# Patient Record
Sex: FEMALE | Race: WHITE | Employment: OTHER | URBAN - METROPOLITAN AREA
[De-identification: names, ages, dates, MRNs, and addresses within clinical notes are randomized per-mention and may not be internally consistent; named-entity substitution may affect disease eponyms.]

---

## 2022-03-04 ENCOUNTER — OFFICE VISIT (OUTPATIENT)
Dept: NEUROLOGY | Age: 67
End: 2022-03-04
Payer: MEDICARE

## 2022-03-04 VITALS
HEART RATE: 85 BPM | RESPIRATION RATE: 16 BRPM | DIASTOLIC BLOOD PRESSURE: 66 MMHG | TEMPERATURE: 98 F | BODY MASS INDEX: 33.43 KG/M2 | HEIGHT: 66 IN | SYSTOLIC BLOOD PRESSURE: 106 MMHG | OXYGEN SATURATION: 98 % | WEIGHT: 208 LBS

## 2022-03-04 DIAGNOSIS — G20 PARKINSONISM, UNSPECIFIED PARKINSONISM TYPE (HCC): ICD-10-CM

## 2022-03-04 DIAGNOSIS — R25.9 ABNORMAL INVOLUNTARY MOVEMENT: Primary | ICD-10-CM

## 2022-03-04 PROCEDURE — 99204 OFFICE O/P NEW MOD 45 MIN: CPT | Performed by: PSYCHIATRY & NEUROLOGY

## 2022-03-04 RX ORDER — ZINC GLUCONATE 10 MG
LOZENGE ORAL
COMMUNITY

## 2022-03-04 RX ORDER — CHOLECALCIFEROL (VITAMIN D3) 125 MCG
CAPSULE ORAL
COMMUNITY

## 2022-03-04 RX ORDER — CHOLECALCIFEROL (VITAMIN D3) 50 MCG
CAPSULE ORAL
COMMUNITY

## 2022-03-04 RX ORDER — ESCITALOPRAM OXALATE 20 MG/1
40 TABLET ORAL DAILY
COMMUNITY

## 2022-03-04 RX ORDER — LANOLIN ALCOHOL/MO/W.PET/CERES
CREAM (GRAM) TOPICAL
COMMUNITY

## 2022-03-04 RX ORDER — LANOLIN ALCOHOL/MO/W.PET/CERES
1000 CREAM (GRAM) TOPICAL DAILY
COMMUNITY

## 2022-03-04 RX ORDER — MINERAL OIL
180 ENEMA (ML) RECTAL DAILY
COMMUNITY

## 2022-03-04 RX ORDER — INSULIN GLARGINE 100 [IU]/ML
38 INJECTION, SOLUTION SUBCUTANEOUS DAILY
COMMUNITY

## 2022-03-04 NOTE — PATIENT INSTRUCTIONS
RESULT POLICY      If we have ordered testing for you, know that; \"NO NEWS IS GOOD NEWS! \"     It is our policy that we no longer call patients with results, nor do we  give test results over the phone. We schedule follow up appointments so that your results can be discussed in person. This allows you to address any questions you have regarding the results. If you choose to go to an imaging center outside of [facility], it is your responsibility to bring imaging report and disc to follow up appointment. If something of concern is revealed on your test, we will contact you to discuss the matter and if needed schedule a sooner follow up appointment. Additionally, results may be found by using the My Chart feature and one of our patient service representatives at the  can give you instructions on how to access this feature to utilize our electronic medical record system. Thank you for your understanding. [facility]   Statement to Patients  April 1, 2014      In an effort to ensure the large volume of patient prescription refills is processed in the most efficient and expeditious manner, we are asking our patients to assist us by calling your Pharmacy for all prescription refills, this will include also your  Mail Order Pharmacy. The pharmacy will contact our office electronically to continue the refill process. Please do not wait until the last minute to call your pharmacy. We need at least 48 hours (2days) to fill prescriptions. We also encourage you to call your pharmacy before going to  your prescription to make sure it is ready. With regard to controlled substance prescription refill requests (narcotic refills) that need to be picked up at our office, we ask your cooperation by providing us with at least 72 hours (3days) notice that you will need a refill.     We will not refill narcotic prescription refill requests after 4:00pm on any weekday, Monday through Thursday, or after 2:00pm on Fridays, or on the weekends. We encourage everyone to explore another way of getting your prescription refill request processed using [email or web address], our patient web portal through our electronic medical record system. [email or web address] is an efficient and effective way to communicate your medication request directly to the office and  downloadable as an bing on your smart phone . [email or web address] also features a review functionality that allows you to view your medication list as well as leave messages for your physician. Are you ready to get connected? If so please review the attatched instructions or speak to any of our staff to get you set up right away! Thank you so much for your cooperation. Should you have any questions please contact our Practice Administrator.

## 2022-03-04 NOTE — PROGRESS NOTES
[facility] Neurology Clinics and  [address] at [facility] Neurology Clinics at [facility], [address] [facility] [address]  [phone] Office  [phone] Facsimile           Referring: Self    Chief Complaint   Patient presents with    New Patient     Parkinson's // Previous Neuro- Shane Lynne - Parkinson's - seen twice    52-year-old lady presents today for neurologic evaluation regarding right upper extremity tremor. She notes that she started having this tremor probably 2 or 3 years ago. She saw Dr. Sushant Miller x2 and he diagnosed her with Parkinson's but noted only tremor and therefore treatment was deferred. No evaluation in terms of scanning etc. was performed and we discussed the clinical diagnosis. In any regard she notes that she has tremor in the right upper extremity. It is a rest type tremor. She notices an increase in the tremor when she is driving and has her hand on the steering wheel and also notices it when she is walking. She can stop it. No shaking in any other extremity. No change in her gait. Her balance has not been affected. Sometimes she feels like she has tremor on the inside on that right side. She had a first cousin who had some type of tremor but also had dementia and she is unsure of the details. Her sister-in-law has Parkinson's but she is not a blood relation. She has no hobbies that would expose her to toxins such as [employer] working and she has never been a  or had any other exposures.     Records from [facility] neurology finds diagnoses of tremor as well as REM sleep behavior disorder type 2 diabetes and proliferative diabetic retinopathy left eye    Past Medical History:   Diagnosis Date    Anxiety     Depression     Diabetes ([address]        Past Surgical History:   Procedure Laterality Date    HX CARPAL TUNNEL RELEASE      HX  [address]    HX CHOLECYSTECTOMY  1981    HX GASTRIC BYPASS  2010     Current Outpatient Medications   Medication Sig Dispense Refill    liraglutide (VICTOZA) 0.6 mg/0.1 mL (18 mg/3 mL) pnij 1.8 mg by SubCUTAneous route daily.  insulin glargine (LANTUS,BASAGLAR) 100 unit/mL (3 mL) inpn 38 Units by SubCUTAneous route daily.  escitalopram oxalate (LEXAPRO) 20 mg tablet Take 40 mg by mouth daily.  multivit-min/iron/folic/lutein (CENTRUM SILVER WOMEN PO) Take 1 Tablet by mouth daily.  ferrous sulfate (Iron) 325 mg (65 mg iron) tablet Take  by mouth Daily (before breakfast).  magnesium 250 mg tab Take  by mouth.  cholecalciferol, vitamin D3, 50 mcg (2,000 unit) tab Take  by mouth.  cyanocobalamin 1,000 mcg tablet Take 1,000 mcg by mouth daily.  B.animalis,bifid,infantis,long (Probiotic 4X) 10-15 mg TbEC Take  by mouth.  fexofenadine (ALLEGRA) 180 mg tablet Take 180 mg by mouth daily. Allergies   Allergen Reactions    Morphine Hives and Other (comments)    Doxycycline Other (comments)    Epinephrine Other (comments)       Social History     Tobacco Use    Smoking status: Never Smoker    Smokeless tobacco: Never Used   Vaping Use    Vaping Use: Never used   Substance Use Topics    Alcohol use: Yes     Comment: occass.  Drug use: Never       Family History   Problem Relation Age of Onset    Headache Mother     Dementia Mother     Neuropathy Sister     Neuropathy Brother     Intellectual Disability Son        Review of Systems  Pertinent positives and negatives as noted. Examination  Visit Vitals  /66 (BP 1 Location: Left upper arm, BP Patient Position: Sitting)   Pulse 85   Temp 98 °F (36.7 °C) (Temporal)   Resp 16   Ht 5' 6\" (1.676 m)   Wt 94.3 kg (208 lb)   SpO2 98%   BMI 33.57 kg/m²     Neurologically, she is awake, alert, and oriented with normal speech and language. Her cognition is normal.    Intact cranial nerves 2-12.   No nystagmus. Disk margins are flat bilaterally. She has mild cogwheeling at the right wrist.  No cogwheeling at the left. No postural tremor. No postural reemergence tremor. She has rest tremor of mild degree right upper extremity. Finger tapping as well as hand opening and closing are good. No other bradykinesia. She has no pronation or drift. She generates full strength in the upper and lower extremities to direct confrontational testing. Reflexes are symmetrical in the upper and lower extremities bilaterally. No pathologic reflexes are elicited. Finger nose finger and rapid alternating movements are normal.  Steady gait with some decreased arm swing and she takes a smooth and steady turn. There is no accentuation of tremor with ambulation    Impression/Plan  60-year-old lady with tremor predominant Parkinson's and we discussed pathophysiology etc.  She has not however been evaluated for potential other causes such as ischemic etiology i.e. basal ganglia or brainstem infarct and we therefore will ensure this is not a secondary parkinsonism versus idiopathic Parkinson's with MRI of the brain and carotid Doppler  If the MRI and the Doppler of fine then we will plan to get back together in a year for evaluation and certainly if she has increase in her symptomology where she would like to try medication for the tremor in the interim she will let us know and we would be happy to address      Ethan Thomas MD          This note was created using voice recognition software. Despite editing, there may be syntax errors.

## 2022-03-11 ENCOUNTER — HOSPITAL ENCOUNTER (OUTPATIENT)
Dept: MRI IMAGING | Age: 67
Discharge: HOME | End: 2022-03-11
Attending: PSYCHIATRY & NEUROLOGY
Payer: MEDICARE

## 2022-03-11 DIAGNOSIS — R25.9 ABNORMAL INVOLUNTARY MOVEMENT: ICD-10-CM

## 2022-03-11 DIAGNOSIS — G20 PARKINSONISM, UNSPECIFIED PARKINSONISM TYPE (HCC): ICD-10-CM

## 2022-03-11 PROCEDURE — 70551 MRI BRAIN STEM W/O DYE: CPT

## 2022-03-31 ENCOUNTER — TELEPHONE (OUTPATIENT)
Dept: NEUROLOGY | Age: 67
End: 2022-03-31

## 2022-03-31 NOTE — TELEPHONE ENCOUNTER
Patient would like to speak with Dr. Aakash Castanon. Patient stated she having a lot more symptoms muscle pain and has a break out on her hands. Patients next appointment is not until next year. Please call.

## 2022-04-01 NOTE — TELEPHONE ENCOUNTER
Pt wanted to know if sx could be r/t PD or if she should see rheum that was suggested by pcp. Advised pt that she should see rheum as those sx do not appear to be PD related.

## 2023-03-02 ENCOUNTER — OFFICE VISIT (OUTPATIENT)
Dept: NEUROLOGY | Age: 68
End: 2023-03-02

## 2023-03-02 VITALS
WEIGHT: 208 LBS | HEART RATE: 80 BPM | DIASTOLIC BLOOD PRESSURE: 59 MMHG | SYSTOLIC BLOOD PRESSURE: 123 MMHG | BODY MASS INDEX: 33.57 KG/M2 | RESPIRATION RATE: 16 BRPM | OXYGEN SATURATION: 95 %

## 2023-03-02 DIAGNOSIS — G20 PARKINSONISM, UNSPECIFIED PARKINSONISM TYPE (HCC): Primary | ICD-10-CM

## 2023-03-02 RX ORDER — ATORVASTATIN CALCIUM 10 MG/1
TABLET, FILM COATED ORAL
COMMUNITY
Start: 2023-02-07

## 2023-03-02 RX ORDER — LORAZEPAM 0.5 MG/1
TABLET ORAL
COMMUNITY
Start: 2023-02-07

## 2023-03-02 NOTE — PROGRESS NOTES
[facility] Neurology Clinics and [address] at [facility] Neurology Clinics at [address] [facility], [address] [place] [address] [address] [facility], [address] [place] [address]   [phone]              Chief Complaint   Patient presents with    Parkinsons Disease     Feels more shaky at times     Current Outpatient Medications   Medication Sig Dispense Refill    atorvastatin (LIPITOR) 10 mg tablet       LORazepam (ATIVAN) 0.5 mg tablet TAKE 1 TO 2 TABLETS BY MOUTH TWICE DAILY AS NEEDED FOR ANXIETY      liraglutide (VICTOZA) 0.6 mg/0.1 mL (18 mg/3 mL) pnij 1.8 mg by SubCUTAneous route daily. insulin glargine (LANTUS,BASAGLAR) 100 unit/mL (3 mL) inpn 38 Units by SubCUTAneous route daily. escitalopram oxalate (LEXAPRO) 20 mg tablet Take 40 mg by mouth daily. multivit-min/iron/folic/lutein (CENTRUM SILVER WOMEN PO) Take 1 Tablet by mouth daily. ferrous sulfate 325 mg (65 mg iron) tablet Take  by mouth Daily (before breakfast). magnesium 250 mg tab Take  by mouth. cholecalciferol, vitamin D3, 50 mcg (2,000 unit) tab Take  by mouth.      cyanocobalamin 1,000 mcg tablet Take 1,000 mcg by mouth daily. B.animalis,bifid,infantis,long (Probiotic 4X) 10-15 mg TbEC Take  by mouth. fexofenadine (ALLEGRA) 180 mg tablet Take 180 mg by mouth daily. Allergies   Allergen Reactions    Morphine Hives and Other (comments)    Doxycycline Other (comments)    Epinephrine Other (comments)     Social History     Tobacco Use    Smoking status: Never    Smokeless tobacco: Never   Vaping Use    Vaping Use: Never used   Substance Use Topics    Alcohol use: Yes     Comment: occass. Drug use: Never     52-year-old lady returns today for Parkinson's. I saw her about a year ago for initial consultation. She was previously followed by Dr. Casiano Grandchild. She only had tremor and thus deferred treatment.     MRI of the brain dated March 11, 2022 personally reviewed with age-related changes  Carotid Doppler March 4, 2022 with insignificant stenosis    Today she reports some worsening shakiness. At times particularly at night she feels like she is shaking on the inside. The visible tremor is doing fine. No changes in ambulation. She is staying busy. She was doing water exercise walking in the pool but she is battling her insurance in that regard. Examination  Visit Vitals  BP (!) 123/59   Pulse 80   Resp 16   Wt 94.3 kg (208 lb)   SpO2 95%   BMI 33.57 kg/m²     She is a very pleasant lady. She is awake alert and oriented. Speech and language are normal.  Cognition is normal.  Cranial nerves are intact. She has minimal rest tremor of the right upper and lower extremity. No bradykinesia. No pronation or drift. She ambulates with a good stride and she takes a pivot turn. Impression/Plan  Parkinson's with some perceived worsened shakiness inside as above and we discussed this is pretty common in people with Parkinson's  Overall continue with a watchful approach for now  If she finds tremor increases to the point she would like to try medication or if her gait starts to change she will let me know  Follow-up in 1 year, sooner if needed    Parminder Phillips MD        This note was created using voice recognition software. Despite editing, there may be syntax errors.

## 2024-08-08 ENCOUNTER — OFFICE VISIT (OUTPATIENT)
Age: 69
End: 2024-08-08
Payer: MEDICARE

## 2024-08-08 VITALS
DIASTOLIC BLOOD PRESSURE: 53 MMHG | WEIGHT: 200 LBS | HEIGHT: 66 IN | OXYGEN SATURATION: 97 % | RESPIRATION RATE: 16 BRPM | SYSTOLIC BLOOD PRESSURE: 112 MMHG | BODY MASS INDEX: 32.14 KG/M2 | HEART RATE: 83 BPM

## 2024-08-08 DIAGNOSIS — G20.C PARKINSONIAN TREMOR (HCC): Primary | ICD-10-CM

## 2024-08-08 PROCEDURE — G8419 CALC BMI OUT NRM PARAM NOF/U: HCPCS | Performed by: PSYCHIATRY & NEUROLOGY

## 2024-08-08 PROCEDURE — 1123F ACP DISCUSS/DSCN MKR DOCD: CPT | Performed by: PSYCHIATRY & NEUROLOGY

## 2024-08-08 PROCEDURE — G8400 PT W/DXA NO RESULTS DOC: HCPCS | Performed by: PSYCHIATRY & NEUROLOGY

## 2024-08-08 PROCEDURE — 3017F COLORECTAL CA SCREEN DOC REV: CPT | Performed by: PSYCHIATRY & NEUROLOGY

## 2024-08-08 PROCEDURE — 1036F TOBACCO NON-USER: CPT | Performed by: PSYCHIATRY & NEUROLOGY

## 2024-08-08 PROCEDURE — 1090F PRES/ABSN URINE INCON ASSESS: CPT | Performed by: PSYCHIATRY & NEUROLOGY

## 2024-08-08 PROCEDURE — G8427 DOCREV CUR MEDS BY ELIG CLIN: HCPCS | Performed by: PSYCHIATRY & NEUROLOGY

## 2024-08-08 PROCEDURE — 99213 OFFICE O/P EST LOW 20 MIN: CPT | Performed by: PSYCHIATRY & NEUROLOGY

## 2024-08-08 NOTE — PROGRESS NOTES
[facility] Neurology Clinics and Neuro[facility] Center at [facility] Neurology Clinics at [address] [place] [address] [address] [facility] [address] [place] [address] [phone]              Chief Complaint   Patient presents with    PD     Denies tremors, does report nightt time leg movement// denies falls, gait concerns   Notes she is in [facility] r/t Vertigo      Current Outpatient Medications   Medication Sig Dispense Refill    Cholecalciferol 50 MCG (2000 UT) TABS Take by mouth      escitalopram (LEXAPRO) 20 MG tablet Take 2 tablets by mouth daily      fexofenadine (ALLEGRA) 180 MG tablet Take 1 tablet by mouth daily      Liraglutide (VICTOZA) 18 MG/3ML SOPN SC injection Inject 1.8 mg into the skin daily      LORazepam (ATIVAN) 0.5 MG tablet TAKE 1 TO 2 TABLETS BY MOUTH TWICE DAILY AS NEEDED FOR ANXIETY      atorvastatin (LIPITOR) 10 MG tablet ceived the following from [facility]: Outside name: atorvastatin (LIPITOR) 10 mg tablet (Patient not taking: Reported on 8/8/2024)      cyanocobalamin 1000 MCG tablet Take 1,000 mcg by mouth daily (Patient not taking: Reported on 8/8/2024)      ferrous sulfate (IRON 325) 325 (65 Fe) MG tablet Take by mouth every morning (before breakfast) (Patient not taking: Reported on 8/8/2024)      insulin glargine (LANTUS SOLOSTAR) 100 UNIT/ML injection pen Inject 38 Units into the skin daily (Patient not taking: Reported on 8/8/2024)       No current facility-administered medications for this visit.      Allergies   Allergen Reactions    Morphine Hives and Other (See Comments)    Doxycycline Other (See Comments)    Epinephrine Other (See Comments)     Social History     Tobacco Use    Smoking status: Never    Smokeless tobacco: Never   Substance Use Topics    Alcohol use: Yes     Comment: social    Drug use: Never     68-year-old lady following up today for Parkinson's.  When I saw her

## 2025-08-14 ENCOUNTER — OFFICE VISIT (OUTPATIENT)
Age: 70
End: 2025-08-14
Payer: MEDICARE

## 2025-08-14 VITALS
HEART RATE: 75 BPM | TEMPERATURE: 96.6 F | OXYGEN SATURATION: 98 % | RESPIRATION RATE: 16 BRPM | DIASTOLIC BLOOD PRESSURE: 51 MMHG | SYSTOLIC BLOOD PRESSURE: 98 MMHG

## 2025-08-14 DIAGNOSIS — H81.10 BENIGN PAROXYSMAL POSITIONAL VERTIGO, UNSPECIFIED LATERALITY: ICD-10-CM

## 2025-08-14 DIAGNOSIS — G20.C PARKINSONIAN TREMOR (HCC): Primary | ICD-10-CM

## 2025-08-14 PROCEDURE — 3017F COLORECTAL CA SCREEN DOC REV: CPT | Performed by: PSYCHIATRY & NEUROLOGY

## 2025-08-14 PROCEDURE — 1036F TOBACCO NON-USER: CPT | Performed by: PSYCHIATRY & NEUROLOGY

## 2025-08-14 PROCEDURE — 1090F PRES/ABSN URINE INCON ASSESS: CPT | Performed by: PSYCHIATRY & NEUROLOGY

## 2025-08-14 PROCEDURE — 1123F ACP DISCUSS/DSCN MKR DOCD: CPT | Performed by: PSYCHIATRY & NEUROLOGY

## 2025-08-14 PROCEDURE — G8421 BMI NOT CALCULATED: HCPCS | Performed by: PSYCHIATRY & NEUROLOGY

## 2025-08-14 PROCEDURE — 99212 OFFICE O/P EST SF 10 MIN: CPT | Performed by: PSYCHIATRY & NEUROLOGY

## 2025-08-14 PROCEDURE — G8427 DOCREV CUR MEDS BY ELIG CLIN: HCPCS | Performed by: PSYCHIATRY & NEUROLOGY

## 2025-08-14 PROCEDURE — G8400 PT W/DXA NO RESULTS DOC: HCPCS | Performed by: PSYCHIATRY & NEUROLOGY

## 2025-08-14 PROCEDURE — 1159F MED LIST DOCD IN RCRD: CPT | Performed by: PSYCHIATRY & NEUROLOGY

## 2025-08-14 RX ORDER — TIRZEPATIDE 12.5 MG/.5ML
12.5 INJECTION, SOLUTION SUBCUTANEOUS WEEKLY
COMMUNITY
Start: 2025-07-28

## 2025-08-14 RX ORDER — ATORVASTATIN CALCIUM 10 MG/1
10 TABLET, FILM COATED ORAL EVERY OTHER DAY
Status: SHIPPED | COMMUNITY
Start: 2025-08-14

## 2025-08-14 ASSESSMENT — PATIENT HEALTH QUESTIONNAIRE - PHQ9
SUM OF ALL RESPONSES TO PHQ QUESTIONS 1-9: 0
2. FEELING DOWN, DEPRESSED OR HOPELESS: NOT AT ALL
SUM OF ALL RESPONSES TO PHQ QUESTIONS 1-9: 0
1. LITTLE INTEREST OR PLEASURE IN DOING THINGS: NOT AT ALL